# Patient Record
Sex: FEMALE | Race: WHITE | Employment: FULL TIME | URBAN - METROPOLITAN AREA
[De-identification: names, ages, dates, MRNs, and addresses within clinical notes are randomized per-mention and may not be internally consistent; named-entity substitution may affect disease eponyms.]

---

## 2024-01-30 ENCOUNTER — APPOINTMENT (OUTPATIENT)
Dept: GENERAL RADIOLOGY | Age: 29
End: 2024-01-30

## 2024-01-30 ENCOUNTER — HOSPITAL ENCOUNTER (EMERGENCY)
Age: 29
Discharge: HOME | End: 2024-01-30
Attending: EMERGENCY MEDICINE

## 2024-01-30 VITALS
DIASTOLIC BLOOD PRESSURE: 77 MMHG | HEART RATE: 90 BPM | WEIGHT: 140 LBS | OXYGEN SATURATION: 97 % | RESPIRATION RATE: 18 BRPM | HEIGHT: 65 IN | TEMPERATURE: 98.2 F | BODY MASS INDEX: 23.32 KG/M2 | SYSTOLIC BLOOD PRESSURE: 113 MMHG

## 2024-01-30 DIAGNOSIS — M77.8 LEFT WRIST TENDONITIS: Primary | ICD-10-CM

## 2024-01-30 PROCEDURE — 99283 EMERGENCY DEPT VISIT LOW MDM: CPT

## 2024-01-30 PROCEDURE — 73110 X-RAY EXAM OF WRIST: CPT

## 2024-01-30 PROCEDURE — 73130 X-RAY EXAM OF HAND: CPT

## 2024-01-30 ASSESSMENT — LIFESTYLE VARIABLES
HOW MANY STANDARD DRINKS CONTAINING ALCOHOL DO YOU HAVE ON A TYPICAL DAY: 1 OR 2
HOW OFTEN DO YOU HAVE A DRINK CONTAINING ALCOHOL: MONTHLY OR LESS

## 2024-01-30 NOTE — ED PROVIDER NOTES
eMERGENCY dEPARTMENT eNCOUnter   Independent Attestation     Pt Name: Florence Anderson  MRN: [id number]  Birthdate 1995  Date of evaluation: 1/30/24     Florence Anderson is a 28 y.o. female with CC: Wrist Pain (Left )      Based on the medical record the care appears appropriate.  I was personally available for consultation in the Emergency Department.    Con Dutta MD  Attending Emergency Physician                  Con Dutta MD  01/30/24 [id number]    
Swelling, tenderness and bony tenderness present. No deformity, effusion, lacerations, snuff box tenderness or crepitus. Normal range of motion. Normal pulse.      Left hand: Tenderness and bony tenderness present. No swelling, deformity or lacerations. Normal range of motion. Normal strength. Normal sensation. There is no disruption of two-point discrimination. Normal capillary refill. Normal pulse.      Comments: Left wrist - tenderness over the wrist and metacarpals.  There is painful ROM of the wrist. No scaphoid tenderness.  Minimal swelling with no erythema, induration, fluctuance or rashes. 2/2 radial pulse. Brisk cap refill. Distal sensation intact.  Good  strength. Compartments are soft.    Skin:     General: Skin is warm.      Capillary Refill: Capillary refill takes less than 2 seconds.      Findings: No erythema.   Neurological:      General: No focal deficit present.      Mental Status: She is alert and oriented to person, place, and time. Mental status is at baseline.         MEDICAL DECISION MAKING / ED COURSE:         1)  Number and Complexity of Problems Addressed at this Encounter  Problem List This Visit:  wrist pain     Differential Diagnosis: wrist sprain, tendonitis     Diagnoses Considered but Do Not Suspect:  fracture, dislocation, septic joint, cellulitis, abscess, dvt       3)  Treatment and Disposition       Left wrist and hand pain.  No injury.  Became severe when pulling on her wrist today.  Pain is worse with ROM.  There are no signs of infection. Imaging is unremarkable.   Suspect tendonitis.  Wrist brace provided.  Recommend motrin or tylenol, ice.      Patient repeat assessment:  pt was sleeping on re-evaluation.     Disposition discussion with patient/family, Shared Decision Making:  Discussed results and plan with the pt.  They expressed appropriate understanding.  Pt given close follow up, supportive care instructions and strict return instructions at the bedside.      MIPS:

## 2024-01-30 NOTE — DISCHARGE INSTRUCTIONS
Recommend ice, rest.  Follow up with PCP or dr. Pearson.  Return to the ED if you develop worsening pain, swelling, redness, numbness, fevers or any other concerning symptoms.